# Patient Record
Sex: FEMALE | Race: BLACK OR AFRICAN AMERICAN | NOT HISPANIC OR LATINO | Employment: UNEMPLOYED | URBAN - METROPOLITAN AREA
[De-identification: names, ages, dates, MRNs, and addresses within clinical notes are randomized per-mention and may not be internally consistent; named-entity substitution may affect disease eponyms.]

---

## 2023-01-24 ENCOUNTER — PROCEDURE VISIT (OUTPATIENT)
Dept: OBSTETRICS AND GYNECOLOGY | Facility: CLINIC | Age: 28
End: 2023-01-24
Payer: COMMERCIAL

## 2023-01-24 ENCOUNTER — OFFICE VISIT (OUTPATIENT)
Dept: OBSTETRICS AND GYNECOLOGY | Facility: CLINIC | Age: 28
End: 2023-01-24
Payer: COMMERCIAL

## 2023-01-24 VITALS
SYSTOLIC BLOOD PRESSURE: 126 MMHG | DIASTOLIC BLOOD PRESSURE: 84 MMHG | HEIGHT: 64 IN | BODY MASS INDEX: 47.74 KG/M2 | WEIGHT: 279.63 LBS

## 2023-01-24 DIAGNOSIS — N97.0 ANOVULATION: Primary | ICD-10-CM

## 2023-01-24 DIAGNOSIS — E66.01 CLASS 3 SEVERE OBESITY WITH SERIOUS COMORBIDITY IN ADULT, UNSPECIFIED BMI, UNSPECIFIED OBESITY TYPE: ICD-10-CM

## 2023-01-24 DIAGNOSIS — N97.0 ANOVULATION: ICD-10-CM

## 2023-01-24 DIAGNOSIS — N92.6 IRREGULAR PERIODS: ICD-10-CM

## 2023-01-24 DIAGNOSIS — Z30.015 ENCOUNTER FOR INITIAL PRESCRIPTION OF VAGINAL RING HORMONAL CONTRACEPTIVE: ICD-10-CM

## 2023-01-24 DIAGNOSIS — L68.0 HIRSUTISM: ICD-10-CM

## 2023-01-24 PROCEDURE — 99204 PR OFFICE/OUTPT VISIT, NEW, LEVL IV, 45-59 MIN: ICD-10-PCS | Mod: S$GLB,,, | Performed by: OBSTETRICS & GYNECOLOGY

## 2023-01-24 PROCEDURE — 76856 PR  ECHO,PELVIC (NONOBSTETRIC): ICD-10-PCS | Mod: S$GLB,,, | Performed by: OBSTETRICS & GYNECOLOGY

## 2023-01-24 PROCEDURE — 76856 US EXAM PELVIC COMPLETE: CPT | Mod: S$GLB,,, | Performed by: OBSTETRICS & GYNECOLOGY

## 2023-01-24 PROCEDURE — 99204 OFFICE O/P NEW MOD 45 MIN: CPT | Mod: S$GLB,,, | Performed by: OBSTETRICS & GYNECOLOGY

## 2023-01-24 RX ORDER — ETONOGESTREL AND ETHINYL ESTRADIOL VAGINAL RING .015; .12 MG/D; MG/D
1 RING VAGINAL
Qty: 1 EACH | Refills: 11 | Status: SHIPPED | OUTPATIENT
Start: 2023-01-24 | End: 2023-02-20 | Stop reason: SDUPTHER

## 2023-01-24 NOTE — PROGRESS NOTES
The uterus is significantly enlarged measuring 14.1 cm in length, and contains multiple fibroids.  Fibroids are ranging in size up to 6.5 cm in diameter.  Endometrial thickness is 0.76 cm.  Neither ovary is visualized

## 2023-01-24 NOTE — PROGRESS NOTES
"Gynecology Visit  History & Physical      SUBJECTIVE:     History of Present Illness:  Patient is a 27 y.o. female presents complaining of irregular periods and uterine cramping.  Reports that she has seen Dr. Gallegos in the past.  She denies a prior diagnosis of PCOS.  She endorses severe hirsutism on her chin.  Reports her last period was 2 months ago.  Reports being diagnosed with diabetes in the past, but recently moved here and has not established care with a provider yet.  She is scheduled to see Dr. Venegas on . She currently has a girlfriend, but reports that she did have sex with a male last month.     Chief Complaint   Patient presents with    Abdominal Pain    Metrorrhagia       Review of patient's allergies indicates:  No Known Allergies    Current Outpatient Medications   Medication Sig Dispense Refill    etonogestreL-ethinyl estradioL (NUVARING) 0.12-0.015 mg/24 hr vaginal ring Place 1 each vaginally every 21 days. Insert one (1) ring vaginally and leave in place for three (3) weeks, then remove for one (1) week. 1 each 11     No current facility-administered medications for this visit.     OB History          0    Para   0    Term   0       0    AB   0    Living   0         SAB   0    IAB   0    Ectopic   0    Multiple   0    Live Births   0             No LMP recorded.      History reviewed. No pertinent past medical history.  Past Surgical History:   Procedure Laterality Date    LEG SURGERY Right      Family History   Problem Relation Age of Onset    Hypertension Maternal Grandmother     Diabetes Maternal Grandmother     Breast cancer Maternal Grandmother     Diabetes Mother      Social History     Tobacco Use    Smoking status: Never    Smokeless tobacco: Never   Substance Use Topics    Alcohol use: Not Currently    Drug use: Never        OBJECTIVE:     Vital Signs (Most Recent)  BP: 126/84 (23 1410)  5' 4" (1.626 m)  126.8 kg (279 lb 9.6 oz)     Physical " Exam:  Physical Exam  Vitals reviewed.   Constitutional:       Appearance: Normal appearance. She is obese.   HENT:      Head: Normocephalic and atraumatic.   Pulmonary:      Effort: Pulmonary effort is normal.   Neurological:      General: No focal deficit present.      Mental Status: She is alert and oriented to person, place, and time.   Psychiatric:         Mood and Affect: Mood normal.         Behavior: Behavior normal.       ASSESSMENT/PLAN:       ICD-10-CM ICD-9-CM   1. Anovulation  N97.0 628.0   2. Irregular periods  N92.6 626.4   3. Hirsutism  L68.0 704.1   4. Class 3 severe obesity with serious comorbidity in adult, unspecified BMI, unspecified obesity type  E66.01 278.01   5. Encounter for initial prescription of vaginal ring hormonal contraceptive  Z30.015 V25.02       PLAN:    --Pelvic ultrasound ordered to assess for PCOS.   --Nuvaring ordered as first-line treatment and to regulate menstruation.  --HCG, hemoglobin A1c, CMP, lipid panel, DHEA-S, and prolactin ordered.  --Follow up with Dr. Venegas on Feb 17 as scheduled  --Follow up with me in 1 month      --Last Pap: May 2022      Neena Justice MD   Gynecology    [address]   [address]  [place] [address] [phone]

## 2023-01-25 ENCOUNTER — PATIENT MESSAGE (OUTPATIENT)
Dept: OBSTETRICS AND GYNECOLOGY | Facility: CLINIC | Age: 28
End: 2023-01-25

## 2023-02-06 ENCOUNTER — OFFICE VISIT (OUTPATIENT)
Dept: OBSTETRICS AND GYNECOLOGY | Facility: CLINIC | Age: 28
End: 2023-02-06

## 2023-02-06 VITALS
BODY MASS INDEX: 48.55 KG/M2 | HEIGHT: 64 IN | SYSTOLIC BLOOD PRESSURE: 122 MMHG | WEIGHT: 284.38 LBS | DIASTOLIC BLOOD PRESSURE: 84 MMHG

## 2023-02-06 DIAGNOSIS — D25.9 UTERINE LEIOMYOMA, UNSPECIFIED LOCATION: Primary | ICD-10-CM

## 2023-02-06 DIAGNOSIS — E28.2 PCOS (POLYCYSTIC OVARIAN SYNDROME): ICD-10-CM

## 2023-02-06 PROCEDURE — 99214 OFFICE O/P EST MOD 30 MIN: CPT | Mod: S$GLB,,, | Performed by: OBSTETRICS & GYNECOLOGY

## 2023-02-06 PROCEDURE — 99214 PR OFFICE/OUTPT VISIT, EST, LEVL IV, 30-39 MIN: ICD-10-PCS | Mod: S$GLB,,, | Performed by: OBSTETRICS & GYNECOLOGY

## 2023-02-06 NOTE — PROGRESS NOTES
"Gynecology Visit     Subjective:       Patient ID: Salina Dumont is a 27 y.o. female.    Chief Complaint:  Follow-up      History of Present Illness  27-year-old G0 presents for follow-up to review lab and imaging results.  She did not have her labs performed yet.  She was prescribed NuvaRing and reports that it has been working well.    GYN & OB History  No LMP recorded.   Date of Last Pap:  May 2022    OB History    Para Term  AB Living   0 0 0 0 0 0   SAB IAB Ectopic Multiple Live Births   0 0 0 0 0           /84 (BP Location: Right arm, Patient Position: Sitting)   Ht 5' 4" (1.626 m)   Wt 129 kg (284 lb 6.4 oz)   BMI 48.82 kg/m²     Objective:    Physical Exam:   Constitutional: She is oriented to person, place, and time. She appears well-developed and well-nourished.    HENT:   Head: Normocephalic and atraumatic.       Pulmonary/Chest: Effort normal.                      Neurological: She is alert and oriented to person, place, and time.     Psychiatric: She has a normal mood and affect.          Pelvic ultrasound performed on 2023:  Uterus measures 14 x 1 x 7 x 6.4 cm  Endometrial thickness measures 7.6 mm  For uterine fibroids ranging in size from 4.5 cm to 7.2 cm    Assessment:        1. Uterine leiomyoma, unspecified location    2. PCOS (polycystic ovarian syndrome)             Plan:      --Reviewed findings of enlarged uterus with multiple fibroids.  --Will treat with NuvaRing at this time.  --If symptoms are not controlled with medication, consider myomectomy to preserve patient's fertility.  --Follow up with Dr. Mesa tomorrow as scheduled.  --Follow-up with me in May for annual exam or sooner as needed.    Neena Justice MD   Gynecology    [facility] [facility]   [address]  [place] [address] [phone]     "

## 2023-02-07 ENCOUNTER — OFFICE VISIT (OUTPATIENT)
Dept: FAMILY MEDICINE | Facility: CLINIC | Age: 28
End: 2023-02-07
Payer: COMMERCIAL

## 2023-02-07 ENCOUNTER — PATIENT MESSAGE (OUTPATIENT)
Dept: FAMILY MEDICINE | Facility: CLINIC | Age: 28
End: 2023-02-07

## 2023-02-07 ENCOUNTER — LAB VISIT (OUTPATIENT)
Dept: LAB | Facility: CLINIC | Age: 28
End: 2023-02-07
Payer: COMMERCIAL

## 2023-02-07 VITALS
OXYGEN SATURATION: 98 % | RESPIRATION RATE: 18 BRPM | WEIGHT: 282 LBS | SYSTOLIC BLOOD PRESSURE: 118 MMHG | BODY MASS INDEX: 48.14 KG/M2 | DIASTOLIC BLOOD PRESSURE: 86 MMHG | TEMPERATURE: 98 F | HEIGHT: 64 IN | HEART RATE: 77 BPM

## 2023-02-07 DIAGNOSIS — N93.8 DUB (DYSFUNCTIONAL UTERINE BLEEDING): ICD-10-CM

## 2023-02-07 DIAGNOSIS — E66.9 OBESITY, UNSPECIFIED CLASSIFICATION, UNSPECIFIED OBESITY TYPE, UNSPECIFIED WHETHER SERIOUS COMORBIDITY PRESENT: Primary | ICD-10-CM

## 2023-02-07 DIAGNOSIS — J32.9 SINUSITIS, UNSPECIFIED CHRONICITY, UNSPECIFIED LOCATION: ICD-10-CM

## 2023-02-07 DIAGNOSIS — N92.6 IRREGULAR PERIODS: ICD-10-CM

## 2023-02-07 DIAGNOSIS — E66.01 CLASS 3 SEVERE OBESITY WITH SERIOUS COMORBIDITY IN ADULT, UNSPECIFIED BMI, UNSPECIFIED OBESITY TYPE: ICD-10-CM

## 2023-02-07 DIAGNOSIS — E66.9 OBESITY, UNSPECIFIED CLASSIFICATION, UNSPECIFIED OBESITY TYPE, UNSPECIFIED WHETHER SERIOUS COMORBIDITY PRESENT: ICD-10-CM

## 2023-02-07 DIAGNOSIS — N97.0 ANOVULATION: ICD-10-CM

## 2023-02-07 PROCEDURE — 84146 ASSAY OF PROLACTIN: CPT | Performed by: OBSTETRICS & GYNECOLOGY

## 2023-02-07 PROCEDURE — 80053 COMPREHEN METABOLIC PANEL: CPT | Performed by: OBSTETRICS & GYNECOLOGY

## 2023-02-07 PROCEDURE — 84702 CHORIONIC GONADOTROPIN TEST: CPT | Performed by: OBSTETRICS & GYNECOLOGY

## 2023-02-07 PROCEDURE — 83036 HEMOGLOBIN GLYCOSYLATED A1C: CPT | Performed by: OBSTETRICS & GYNECOLOGY

## 2023-02-07 PROCEDURE — 80061 LIPID PANEL: CPT | Performed by: OBSTETRICS & GYNECOLOGY

## 2023-02-07 PROCEDURE — 99214 PR OFFICE/OUTPT VISIT, EST, LEVL IV, 30-39 MIN: ICD-10-PCS | Mod: S$GLB,,, | Performed by: FAMILY MEDICINE

## 2023-02-07 PROCEDURE — 85025 COMPLETE CBC W/AUTO DIFF WBC: CPT | Performed by: FAMILY MEDICINE

## 2023-02-07 PROCEDURE — 99214 OFFICE O/P EST MOD 30 MIN: CPT | Mod: S$GLB,,, | Performed by: FAMILY MEDICINE

## 2023-02-07 PROCEDURE — 36415 PR COLLECTION VENOUS BLOOD,VENIPUNCTURE: ICD-10-PCS | Mod: ,,, | Performed by: STUDENT IN AN ORGANIZED HEALTH CARE EDUCATION/TRAINING PROGRAM

## 2023-02-07 PROCEDURE — 84443 ASSAY THYROID STIM HORMONE: CPT | Performed by: OBSTETRICS & GYNECOLOGY

## 2023-02-07 PROCEDURE — 82627 DEHYDROEPIANDROSTERONE: CPT | Performed by: OBSTETRICS & GYNECOLOGY

## 2023-02-07 PROCEDURE — 36415 COLL VENOUS BLD VENIPUNCTURE: CPT | Mod: ,,, | Performed by: STUDENT IN AN ORGANIZED HEALTH CARE EDUCATION/TRAINING PROGRAM

## 2023-02-07 RX ORDER — MONTELUKAST SODIUM 10 MG/1
10 TABLET ORAL NIGHTLY
Qty: 10 TABLET | Refills: 1 | Status: SHIPPED | OUTPATIENT
Start: 2023-02-07 | End: 2023-03-09

## 2023-02-07 RX ORDER — PHENTERMINE HYDROCHLORIDE 37.5 MG/1
37.5 TABLET ORAL
Qty: 30 TABLET | Refills: 0 | Status: SHIPPED | OUTPATIENT
Start: 2023-02-07 | End: 2023-02-09 | Stop reason: SDUPTHER

## 2023-02-07 NOTE — PROGRESS NOTES
Subjective:       Patient ID: Salina Dumont is a 27 y.o. female.    Chief Complaint: Back Pain, Weight Gain, and Sinus Problem      Review of patient's allergies indicates:  No Known Allergies   Here to discuss weight loss irregular periods    Back Pain  This is a chronic problem. The current episode started more than 1 year ago. The problem occurs constantly. The problem is unchanged. The pain is present in the gluteal, sacro-iliac and thoracic spine. The quality of the pain is described as aching, burning, cramping and stabbing. The pain radiates to the right thigh. The pain is at a severity of 10/10. The pain is severe. The pain is Worse during the night. The symptoms are aggravated by bending, position and lying down. Stiffness is present In the morning and at night. Associated symptoms include abdominal pain, headaches, paresthesias and weight loss. Pertinent negatives include no bowel incontinence, chest pain, dysuria, fever, leg pain, numbness, paresis, pelvic pain, perianal numbness, tingling or weakness. The treatment provided mild relief.   Review of Systems   Constitutional:  Positive for weight loss. Negative for chills, fatigue, fever and unexpected weight change.   HENT:  Negative for ear pain, rhinorrhea, sinus pressure/congestion, sneezing and sore throat.    Eyes:  Negative for photophobia and pain.   Respiratory:  Negative for chest tightness, shortness of breath and wheezing.    Cardiovascular:  Negative for chest pain.   Gastrointestinal:  Positive for abdominal pain. Negative for abdominal distention, bowel incontinence, constipation, diarrhea and nausea.   Endocrine: Negative for polydipsia, polyphagia and polyuria.   Genitourinary:  Negative for dysuria, frequency, hematuria, menstrual problem, pelvic pain and urgency.   Musculoskeletal:  Positive for back pain. Negative for joint swelling and leg pain.   Integumentary:  Negative for rash, wound, breast mass and breast discharge.    Allergic/Immunologic: Negative for immunocompromised state.   Neurological:  Positive for headaches and paresthesias. Negative for dizziness, vertigo, tingling, weakness and numbness.   Psychiatric/Behavioral:  Negative for agitation, dysphoric mood and sleep disturbance.    All other systems reviewed and are negative.  Breast: Negative for mass      Objective:      Vitals:    02/07/23 1450   BP: 118/86   Pulse: 77   Resp: 18   Temp: 98 °F (36.7 °C)     Physical Exam  Vitals and nursing note reviewed.   Constitutional:       Appearance: Normal appearance.   HENT:      Head: Normocephalic.      Right Ear: Tympanic membrane normal.      Left Ear: Tympanic membrane normal.      Nose: Nose normal.      Mouth/Throat:      Mouth: Mucous membranes are moist.   Eyes:      Pupils: Pupils are equal, round, and reactive to light.   Cardiovascular:      Rate and Rhythm: Normal rate and regular rhythm.   Pulmonary:      Effort: Pulmonary effort is normal.   Abdominal:      General: Abdomen is flat. Bowel sounds are normal.      Palpations: Abdomen is soft.   Musculoskeletal:         General: Normal range of motion.   Skin:     General: Skin is warm and dry.   Neurological:      General: No focal deficit present.      Mental Status: She is alert.   Psychiatric:         Mood and Affect: Mood normal.         Behavior: Behavior normal.       Assessment:       1. Obesity, unspecified classification, unspecified obesity type, unspecified whether serious comorbidity present    2. Sinusitis, unspecified chronicity, unspecified location    3. DUB (dysfunctional uterine bleeding)        Plan:       Obesity, unspecified classification, unspecified obesity type, unspecified whether serious comorbidity present  -     CBC Auto Differential; Future; Expected date: 02/07/2023    Sinusitis, unspecified chronicity, unspecified location    DUB (dysfunctional uterine bleeding)    Other orders  -     Discontinue: phentermine (ADIPEX-P) 37.5 mg  tablet; Take 1 tablet (37.5 mg total) by mouth before breakfast.  Dispense: 30 tablet; Refill: 0  -     montelukast (SINGULAIR) 10 mg tablet; Take 1 tablet (10 mg total) by mouth every evening.  Dispense: 10 tablet; Refill: 1           Follow up in about 4 weeks (around 3/7/2023).

## 2023-02-08 ENCOUNTER — PATIENT MESSAGE (OUTPATIENT)
Dept: FAMILY MEDICINE | Facility: CLINIC | Age: 28
End: 2023-02-08

## 2023-02-08 ENCOUNTER — PATIENT MESSAGE (OUTPATIENT)
Dept: OBSTETRICS AND GYNECOLOGY | Facility: CLINIC | Age: 28
End: 2023-02-08

## 2023-02-08 LAB
ALBUMIN SERPL BCP-MCNC: 4 G/DL (ref 3.5–5.2)
ALP SERPL-CCNC: 69 U/L (ref 55–135)
ALT SERPL W/O P-5'-P-CCNC: 38 U/L (ref 10–44)
ANION GAP SERPL CALC-SCNC: 7 MMOL/L (ref 8–16)
AST SERPL-CCNC: 25 U/L (ref 10–40)
BASOPHILS # BLD AUTO: 0.02 K/UL (ref 0–0.2)
BASOPHILS NFR BLD: 0.3 % (ref 0–1.9)
BILIRUB SERPL-MCNC: 0.4 MG/DL (ref 0.1–1)
BUN SERPL-MCNC: 12 MG/DL (ref 6–20)
CALCIUM SERPL-MCNC: 9.4 MG/DL (ref 8.7–10.5)
CHLORIDE SERPL-SCNC: 108 MMOL/L (ref 95–110)
CHOLEST SERPL-MCNC: 177 MG/DL (ref 120–199)
CHOLEST/HDLC SERPL: 4.3 {RATIO} (ref 2–5)
CO2 SERPL-SCNC: 23 MMOL/L (ref 23–29)
CREAT SERPL-MCNC: 1.1 MG/DL (ref 0.5–1.4)
DHEA-S SERPL-MCNC: 271 UG/DL (ref 95.8–511.7)
DIFFERENTIAL METHOD: NORMAL
EOSINOPHIL # BLD AUTO: 0.1 K/UL (ref 0–0.5)
EOSINOPHIL NFR BLD: 0.9 % (ref 0–8)
ERYTHROCYTE [DISTWIDTH] IN BLOOD BY AUTOMATED COUNT: 12.1 % (ref 11.5–14.5)
EST. GFR  (NO RACE VARIABLE): >60 ML/MIN/1.73 M^2
ESTIMATED AVG GLUCOSE: 111 MG/DL (ref 68–131)
GLUCOSE SERPL-MCNC: 91 MG/DL (ref 70–110)
HBA1C MFR BLD: 5.5 % (ref 4–5.6)
HCG INTACT+B SERPL-ACNC: <1.2 MIU/ML
HCT VFR BLD AUTO: 39.8 % (ref 37–48.5)
HDLC SERPL-MCNC: 41 MG/DL (ref 40–75)
HDLC SERPL: 23.2 % (ref 20–50)
HGB BLD-MCNC: 13.2 G/DL (ref 12–16)
IMM GRANULOCYTES # BLD AUTO: 0.02 K/UL (ref 0–0.04)
IMM GRANULOCYTES NFR BLD AUTO: 0.3 % (ref 0–0.5)
LDLC SERPL CALC-MCNC: 93.6 MG/DL (ref 63–159)
LYMPHOCYTES # BLD AUTO: 2.5 K/UL (ref 1–4.8)
LYMPHOCYTES NFR BLD: 32.2 % (ref 18–48)
MCH RBC QN AUTO: 28.8 PG (ref 27–31)
MCHC RBC AUTO-ENTMCNC: 33.2 G/DL (ref 32–36)
MCV RBC AUTO: 87 FL (ref 82–98)
MONOCYTES # BLD AUTO: 0.6 K/UL (ref 0.3–1)
MONOCYTES NFR BLD: 7.5 % (ref 4–15)
NEUTROPHILS # BLD AUTO: 4.6 K/UL (ref 1.8–7.7)
NEUTROPHILS NFR BLD: 58.8 % (ref 38–73)
NONHDLC SERPL-MCNC: 136 MG/DL
NRBC BLD-RTO: 0 /100 WBC
PLATELET # BLD AUTO: 266 K/UL (ref 150–450)
PMV BLD AUTO: 11.2 FL (ref 9.2–12.9)
POTASSIUM SERPL-SCNC: 4.3 MMOL/L (ref 3.5–5.1)
PROLACTIN SERPL IA-MCNC: 31.4 NG/ML (ref 5.2–26.5)
PROT SERPL-MCNC: 7.6 G/DL (ref 6–8.4)
RBC # BLD AUTO: 4.58 M/UL (ref 4–5.4)
SODIUM SERPL-SCNC: 138 MMOL/L (ref 136–145)
TRIGL SERPL-MCNC: 212 MG/DL (ref 30–150)
TSH SERPL DL<=0.005 MIU/L-ACNC: 1.19 UIU/ML (ref 0.4–4)
WBC # BLD AUTO: 7.77 K/UL (ref 3.9–12.7)

## 2023-02-08 RX ORDER — MONTELUKAST SODIUM 10 MG/1
10 TABLET ORAL NIGHTLY
Qty: 10 TABLET | Refills: 1 | Status: CANCELLED | OUTPATIENT
Start: 2023-02-08 | End: 2023-03-10

## 2023-02-09 ENCOUNTER — PATIENT MESSAGE (OUTPATIENT)
Dept: OBSTETRICS AND GYNECOLOGY | Facility: CLINIC | Age: 28
End: 2023-02-09

## 2023-02-09 RX ORDER — ETONOGESTREL AND ETHINYL ESTRADIOL VAGINAL RING .015; .12 MG/D; MG/D
1 RING VAGINAL
Qty: 1 EACH | Refills: 11 | OUTPATIENT
Start: 2023-02-09 | End: 2024-02-09

## 2023-02-09 RX ORDER — PHENTERMINE HYDROCHLORIDE 37.5 MG/1
37.5 TABLET ORAL
Qty: 30 TABLET | Refills: 0 | Status: SHIPPED | OUTPATIENT
Start: 2023-02-09 | End: 2023-03-11

## 2023-02-09 RX ORDER — PHENTERMINE HYDROCHLORIDE 37.5 MG/1
37.5 TABLET ORAL
Qty: 30 TABLET | Refills: 0 | OUTPATIENT
Start: 2023-02-09 | End: 2023-03-11

## 2023-02-13 NOTE — TELEPHONE ENCOUNTER
Call placed to patient due to Ms. Tyson CHAS Ragsdale orders to inform patient that the CBC that Dr. Kimble ordered is ok, but her prolactin level ordered by  was elevated and to contact her for course of treatment. States will contact Dr. Justice.    Marbella Ragsdale NP  You 3 days ago     The CBC that Dr. Kimble ordered is ok. The prolactin level is up, but ordered by Dr. Justice, recommend she followup for that result with her, unsure of what she ordered this in reference to.   I have not seen her before I don't believe.   CBR      You  Marbella Ragsdale NP 4 days ago     CR  Good morning Ms. Tyson,   This patient is requesting lab results from recent labs. Noted 3 lab tests that is abnormal.   Please review and advise.   Thank you.   Signed:   Thania Jennings LPN

## 2023-02-23 RX ORDER — ETONOGESTREL AND ETHINYL ESTRADIOL VAGINAL RING .015; .12 MG/D; MG/D
1 RING VAGINAL
Qty: 1 EACH | Refills: 11 | Status: SHIPPED | OUTPATIENT
Start: 2023-02-23 | End: 2024-02-23

## 2023-07-05 ENCOUNTER — PATIENT MESSAGE (OUTPATIENT)
Dept: RESEARCH | Facility: HOSPITAL | Age: 28
End: 2023-07-05